# Patient Record
Sex: FEMALE | Race: OTHER | Employment: UNEMPLOYED | URBAN - METROPOLITAN AREA
[De-identification: names, ages, dates, MRNs, and addresses within clinical notes are randomized per-mention and may not be internally consistent; named-entity substitution may affect disease eponyms.]

---

## 2021-03-27 ENCOUNTER — OFFICE VISIT (OUTPATIENT)
Dept: FAMILY MEDICINE CLINIC | Facility: CLINIC | Age: 61
End: 2021-03-27
Payer: COMMERCIAL

## 2021-03-27 VITALS
SYSTOLIC BLOOD PRESSURE: 144 MMHG | HEIGHT: 67 IN | WEIGHT: 188 LBS | BODY MASS INDEX: 29.51 KG/M2 | DIASTOLIC BLOOD PRESSURE: 85 MMHG | HEART RATE: 64 BPM

## 2021-03-27 DIAGNOSIS — E05.90 HYPERTHYROIDISM: ICD-10-CM

## 2021-03-27 DIAGNOSIS — Z00.00 ANNUAL PHYSICAL EXAM: Primary | ICD-10-CM

## 2021-03-27 PROCEDURE — 3008F BODY MASS INDEX DOCD: CPT | Performed by: FAMILY MEDICINE

## 2021-03-27 PROCEDURE — 99386 PREV VISIT NEW AGE 40-64: CPT | Performed by: FAMILY MEDICINE

## 2021-03-27 PROCEDURE — 3077F SYST BP >= 140 MM HG: CPT | Performed by: FAMILY MEDICINE

## 2021-03-27 PROCEDURE — 3079F DIAST BP 80-89 MM HG: CPT | Performed by: FAMILY MEDICINE

## 2021-03-27 RX ORDER — AMLODIPINE BESYLATE 5 MG/1
5 TABLET ORAL DAILY
Qty: 90 TABLET | Refills: 0 | Status: SHIPPED | OUTPATIENT
Start: 2021-03-27 | End: 2022-03-22

## 2021-03-27 RX ORDER — AMLODIPINE BESYLATE 2.5 MG/1
2.5 TABLET ORAL DAILY
COMMUNITY
Start: 2020-12-10 | End: 2021-03-27

## 2021-03-27 RX ORDER — METHIMAZOLE 5 MG/1
2.5 TABLET ORAL DAILY
Qty: 45 TABLET | Refills: 0 | Status: SHIPPED | OUTPATIENT
Start: 2021-03-27 | End: 2021-09-24

## 2021-03-27 RX ORDER — METHIMAZOLE 5 MG/1
5 TABLET ORAL DAILY
COMMUNITY
Start: 2020-12-10 | End: 2021-03-27

## 2021-03-27 RX ORDER — PROPRANOLOL HYDROCHLORIDE 20 MG/1
20 TABLET ORAL EVERY 8 HOURS
COMMUNITY
Start: 2020-12-10 | End: 2021-03-27

## 2021-03-27 RX ORDER — PROPRANOLOL HYDROCHLORIDE 20 MG/1
20 TABLET ORAL 2 TIMES DAILY
Qty: 180 TABLET | Refills: 0 | Status: SHIPPED | OUTPATIENT
Start: 2021-03-27

## 2021-03-27 NOTE — PROGRESS NOTES
HPI/Subjective:   Patient ID: Mary Beth Eugene is a 63year old female. New patient. History medications reviewed. Needs blood test   Feeling well except gained a lot of weight      History/Other:   Review of Systems   Constitutional: Negative.   Negative Prescriptions     Signed Prescriptions Disp Refills   • amLODIPine Besylate 5 MG Oral Tab 90 tablet 0     Sig: Take 1 tablet (5 mg total) by mouth daily. • methimazole 5 MG Oral Tab 45 tablet 0     Sig: Take 0.5 tablets (2.5 mg total) by mouth daily.    •

## 2021-03-31 ENCOUNTER — LAB ENCOUNTER (OUTPATIENT)
Dept: LAB | Age: 61
End: 2021-03-31
Attending: FAMILY MEDICINE
Payer: COMMERCIAL

## 2021-03-31 DIAGNOSIS — E05.90 HYPERTHYROIDISM: ICD-10-CM

## 2021-03-31 DIAGNOSIS — Z00.00 ANNUAL PHYSICAL EXAM: ICD-10-CM

## 2021-03-31 PROCEDURE — 80061 LIPID PANEL: CPT

## 2021-03-31 PROCEDURE — 85025 COMPLETE CBC W/AUTO DIFF WBC: CPT

## 2021-03-31 PROCEDURE — 80053 COMPREHEN METABOLIC PANEL: CPT

## 2021-03-31 PROCEDURE — 84439 ASSAY OF FREE THYROXINE: CPT

## 2021-03-31 PROCEDURE — 36415 COLL VENOUS BLD VENIPUNCTURE: CPT

## 2021-03-31 PROCEDURE — 84443 ASSAY THYROID STIM HORMONE: CPT

## 2021-04-06 ENCOUNTER — VIRTUAL PHONE E/M (OUTPATIENT)
Dept: FAMILY MEDICINE CLINIC | Facility: CLINIC | Age: 61
End: 2021-04-06
Payer: COMMERCIAL

## 2021-04-06 ENCOUNTER — TELEPHONE (OUTPATIENT)
Dept: FAMILY MEDICINE CLINIC | Facility: CLINIC | Age: 61
End: 2021-04-06

## 2021-04-06 DIAGNOSIS — E05.90 HYPERTHYROIDISM: Primary | ICD-10-CM

## 2021-04-06 PROCEDURE — 99213 OFFICE O/P EST LOW 20 MIN: CPT | Performed by: FAMILY MEDICINE

## 2021-04-06 RX ORDER — METHIMAZOLE 5 MG/1
5 TABLET ORAL DAILY
Qty: 90 TABLET | Refills: 1 | Status: SHIPPED | OUTPATIENT
Start: 2021-04-06 | End: 2021-09-24

## 2021-04-09 NOTE — PROGRESS NOTES
HPI/Subjective:   Patient ID: Malaika Prakash is a 63year old female.     Telehealth outside of [facility] [address] Verbal Consent   I conducted a telehealth visit with Malaika Prakash today, 04/09/21, which was completed using two-way, real-time interactive diaphoresis and fatigue. Respiratory: Negative. Negative for apnea, cough, chest tightness and shortness of breath. Cardiovascular: Negative. Negative for chest pain, palpitations and leg swelling. Gastrointestinal: Negative.   Negative for abdom

## 2021-04-10 ENCOUNTER — OFFICE VISIT (OUTPATIENT)
Dept: FAMILY MEDICINE CLINIC | Facility: CLINIC | Age: 61
End: 2021-04-10
Payer: COMMERCIAL

## 2021-04-10 VITALS
HEIGHT: 67 IN | DIASTOLIC BLOOD PRESSURE: 83 MMHG | WEIGHT: 184 LBS | BODY MASS INDEX: 28.88 KG/M2 | SYSTOLIC BLOOD PRESSURE: 156 MMHG | HEART RATE: 58 BPM

## 2021-04-10 DIAGNOSIS — E05.90 HYPERTHYROIDISM: ICD-10-CM

## 2021-04-10 DIAGNOSIS — Z12.31 ENCOUNTER FOR SCREENING MAMMOGRAM FOR BREAST CANCER: Primary | ICD-10-CM

## 2021-04-10 PROCEDURE — 99213 OFFICE O/P EST LOW 20 MIN: CPT | Performed by: FAMILY MEDICINE

## 2021-04-10 PROCEDURE — 3008F BODY MASS INDEX DOCD: CPT | Performed by: FAMILY MEDICINE

## 2021-04-10 PROCEDURE — 3079F DIAST BP 80-89 MM HG: CPT | Performed by: FAMILY MEDICINE

## 2021-04-10 PROCEDURE — 3077F SYST BP >= 140 MM HG: CPT | Performed by: FAMILY MEDICINE

## 2021-04-12 NOTE — PROGRESS NOTES
HPI/Subjective:   Patient ID: Pepper Elliott is a 63year old female. Here for ob gyn visit . Overall doing well      History/Other:    Constitutional: Negative. Negative for activity change, appetite change, diaphoresis and fatigue.      Respiratory: N discharge or friability. Uterus: Not deviated, not enlarged, not fixed and not tender. Adnexa:         Right: No mass, tenderness or fullness. Left: No mass, tenderness or fullness.      Musculoskeletal:         General: Normal range of

## 2021-05-23 ENCOUNTER — HOSPITAL ENCOUNTER (OUTPATIENT)
Dept: MAMMOGRAPHY | Facility: HOSPITAL | Age: 61
Discharge: HOME | End: 2021-05-23
Attending: FAMILY MEDICINE
Payer: COMMERCIAL

## 2021-05-23 DIAGNOSIS — Z12.31 ENCOUNTER FOR SCREENING MAMMOGRAM FOR BREAST CANCER: ICD-10-CM

## 2021-05-23 PROCEDURE — 77063 BREAST TOMOSYNTHESIS BI: CPT | Performed by: FAMILY MEDICINE

## 2021-05-23 PROCEDURE — 77067 SCR MAMMO BI INCL CAD: CPT | Performed by: FAMILY MEDICINE

## 2021-07-06 NOTE — H&P
Name: Karen Bradley  Date: 7/7/2021    Referring Physician: Nickie Skaggs    Patient presents with:  Consult: pcp referred for hyperthyroidism. Has been on Methimazole 5mg since April and feels good.        HISTORY OF PRESENT ILLNESS   Karen Bradley is a 5 mg total) by mouth daily. (Patient not taking: Reported on 7/7/2021 ), Disp: 45 tablet, Rfl: 0     Allergies:   No Known Allergies    REVIEW OF SYSTEMS  Eyes: no change in vision  Neurologic: no headache, generalized or focal weakness or numbness.   Head: n benefits discussed: Radioactive iodine (JAVED) vs Surgery vs anti thyroid drugs (ATD) x 18 months. - she is currently on MMI 5mg PO qday    -  Patient is aware of the following:   They will need life long replacement with levothyroxine after surgery and possib fever, jaundice, dark urine, light stools, abdominal pain, anorexia, nausea, rash or joint pains,  For now, patient will continue the MMI 5mg PO qday. We will adjust the dose once we see what her test results show.  I have explained to the patient about the

## 2021-07-07 ENCOUNTER — OFFICE VISIT (OUTPATIENT)
Dept: ENDOCRINOLOGY CLINIC | Facility: CLINIC | Age: 61
End: 2021-07-07
Payer: COMMERCIAL

## 2021-07-07 ENCOUNTER — LAB ENCOUNTER (OUTPATIENT)
Dept: LAB | Facility: HOSPITAL | Age: 61
End: 2021-07-07
Attending: INTERNAL MEDICINE
Payer: COMMERCIAL

## 2021-07-07 VITALS
HEART RATE: 64 BPM | DIASTOLIC BLOOD PRESSURE: 83 MMHG | BODY MASS INDEX: 27.78 KG/M2 | RESPIRATION RATE: 14 BRPM | WEIGHT: 177 LBS | SYSTOLIC BLOOD PRESSURE: 132 MMHG | HEIGHT: 67 IN

## 2021-07-07 DIAGNOSIS — E05.90 HYPERTHYROIDISM: Primary | ICD-10-CM

## 2021-07-07 DIAGNOSIS — E05.90 HYPERTHYROIDISM: ICD-10-CM

## 2021-07-07 LAB
T3 SERPL-MCNC: 108 NG/DL (ref 60–181)
T4 FREE SERPL-MCNC: 1 NG/DL (ref 0.8–1.7)
TSI SER-ACNC: 0.13 MIU/ML (ref 0.36–3.74)

## 2021-07-07 PROCEDURE — 84480 ASSAY TRIIODOTHYRONINE (T3): CPT

## 2021-07-07 PROCEDURE — 3079F DIAST BP 80-89 MM HG: CPT | Performed by: INTERNAL MEDICINE

## 2021-07-07 PROCEDURE — 3008F BODY MASS INDEX DOCD: CPT | Performed by: INTERNAL MEDICINE

## 2021-07-07 PROCEDURE — 36415 COLL VENOUS BLD VENIPUNCTURE: CPT

## 2021-07-07 PROCEDURE — 84443 ASSAY THYROID STIM HORMONE: CPT

## 2021-07-07 PROCEDURE — 99204 OFFICE O/P NEW MOD 45 MIN: CPT | Performed by: INTERNAL MEDICINE

## 2021-07-07 PROCEDURE — 3075F SYST BP GE 130 - 139MM HG: CPT | Performed by: INTERNAL MEDICINE

## 2021-07-07 PROCEDURE — 84445 ASSAY OF TSI GLOBULIN: CPT

## 2021-07-07 PROCEDURE — 84439 ASSAY OF FREE THYROXINE: CPT

## 2021-07-08 PROBLEM — E05.90 HYPERTHYROIDISM: Status: ACTIVE | Noted: 2021-07-08

## 2021-07-11 ENCOUNTER — TELEPHONE (OUTPATIENT)
Dept: ENDOCRINOLOGY CLINIC | Facility: CLINIC | Age: 61
End: 2021-07-11

## 2021-07-11 DIAGNOSIS — E05.90 HYPERTHYROIDISM: Primary | ICD-10-CM

## 2021-07-12 NOTE — TELEPHONE ENCOUNTER
Called patient with [facility]  [phone] to patient's son to relay message below - he stated understanding for patient to continue taking MMI 5mg daily and to repeat labs in 6 weeks  Son stated patient will call to schedule f/u

## 2021-07-12 NOTE — TELEPHONE ENCOUNTER
Hi!  Could we please call this patient and let her know that I have reviewed her thyroid function tests and I would like her to continue on MMI 5mg PO qday and recheck her blood tests in 6 weeks mikhail fernandez her in clinic in 3 months with repeat blood test

## 2021-07-12 NOTE — TELEPHONE ENCOUNTER
Called patient with [facility] , Cindy Browning #[phone] - patient stated she speaks Ch[place] and requested call after 3pm (at work now)    Will call patient after 3pm with [place]

## 2021-07-13 LAB — THYROID STIMULATING IMMUNOGLOBULIN: 11.7 IU/L

## 2021-09-02 ENCOUNTER — LAB ENCOUNTER (OUTPATIENT)
Dept: LAB | Age: 61
End: 2021-09-02
Attending: INTERNAL MEDICINE
Payer: COMMERCIAL

## 2021-09-02 DIAGNOSIS — E05.90 HYPERTHYROIDISM: ICD-10-CM

## 2021-09-02 LAB
T3 SERPL-MCNC: 112 NG/DL (ref 60–181)
T4 FREE SERPL-MCNC: 0.8 NG/DL (ref 0.8–1.7)
TSI SER-ACNC: 2.34 MIU/ML (ref 0.36–3.74)

## 2021-09-02 PROCEDURE — 84443 ASSAY THYROID STIM HORMONE: CPT

## 2021-09-02 PROCEDURE — 84439 ASSAY OF FREE THYROXINE: CPT

## 2021-09-02 PROCEDURE — 36415 COLL VENOUS BLD VENIPUNCTURE: CPT

## 2021-09-02 PROCEDURE — 84480 ASSAY TRIIODOTHYRONINE (T3): CPT

## 2021-09-24 ENCOUNTER — TELEPHONE (OUTPATIENT)
Dept: ENDOCRINOLOGY CLINIC | Facility: CLINIC | Age: 61
End: 2021-09-24

## 2021-09-24 DIAGNOSIS — E05.90 HYPERTHYROIDISM: Primary | ICD-10-CM

## 2021-09-24 RX ORDER — METHIMAZOLE 5 MG/1
2.5 TABLET ORAL EVERY MORNING
Qty: 45 TABLET | Refills: 0 | Status: SHIPPED | OUTPATIENT
Start: 2021-09-24 | End: 2021-11-26

## 2021-09-24 NOTE — TELEPHONE ENCOUNTER
Hi!  Can we please call this patient and let her know that she should decrease her methimazole dose to 2.5mg PO qday and recheck labs in 6 weeks and see me in clinic at that time? Thank you!

## 2021-09-27 NOTE — TELEPHONE ENCOUNTER
Spoke to patient - PATIENT DOES NOT SPEAK [place] - patient able to communicate with RN - stated understanding to take 2.5mg MMI daily (patient stated no refill needed at this time)  Patient stated understanding to repeat labs in 6 weeks  Patient stated sh

## 2021-11-19 ENCOUNTER — LAB ENCOUNTER (OUTPATIENT)
Dept: LAB | Age: 61
End: 2021-11-19
Attending: INTERNAL MEDICINE
Payer: COMMERCIAL

## 2021-11-19 ENCOUNTER — APPOINTMENT (OUTPATIENT)
Dept: LAB | Facility: HOSPITAL | Age: 61
End: 2021-11-19
Attending: INTERNAL MEDICINE
Payer: COMMERCIAL

## 2021-11-19 DIAGNOSIS — E05.90 HYPERTHYROIDISM: ICD-10-CM

## 2021-11-19 PROCEDURE — 84480 ASSAY TRIIODOTHYRONINE (T3): CPT

## 2021-11-19 PROCEDURE — 84443 ASSAY THYROID STIM HORMONE: CPT

## 2021-11-19 PROCEDURE — 36415 COLL VENOUS BLD VENIPUNCTURE: CPT

## 2021-11-19 PROCEDURE — 84439 ASSAY OF FREE THYROXINE: CPT

## 2021-11-25 ENCOUNTER — TELEPHONE (OUTPATIENT)
Dept: ENDOCRINOLOGY CLINIC | Facility: CLINIC | Age: 61
End: 2021-11-25

## 2021-11-25 DIAGNOSIS — E05.90 HYPERTHYROIDISM: ICD-10-CM

## 2021-11-25 NOTE — TELEPHONE ENCOUNTER
Hi!    Can we please let the patient know that her TSH and FT4 are at goal. I would like to see the patient in follow-up please. The patient may continue on methimazole 2.5mg PO qday for now.

## 2021-11-26 RX ORDER — METHIMAZOLE 5 MG/1
2.5 TABLET ORAL EVERY MORNING
Qty: 15 TABLET | Refills: 0 | Status: SHIPPED | OUTPATIENT
Start: 2021-11-26 | End: 2021-12-26

## 2021-11-26 NOTE — TELEPHONE ENCOUNTER
Called patient and states she can understand [place] and declined . She confirms taking methimazole 2.5 mg daily. She said she has the 5 mg tablets and cuts them in half.   RN offered appointment for Monday 11/29/21 but states can't make it, th

## 2021-12-27 ENCOUNTER — OFFICE VISIT (OUTPATIENT)
Dept: ENDOCRINOLOGY CLINIC | Facility: CLINIC | Age: 61
End: 2021-12-27
Payer: COMMERCIAL

## 2021-12-27 VITALS
HEIGHT: 67 IN | HEART RATE: 67 BPM | BODY MASS INDEX: 27.47 KG/M2 | DIASTOLIC BLOOD PRESSURE: 92 MMHG | WEIGHT: 175 LBS | SYSTOLIC BLOOD PRESSURE: 151 MMHG

## 2021-12-27 DIAGNOSIS — I10 HYPERTENSION, UNSPECIFIED TYPE: ICD-10-CM

## 2021-12-27 DIAGNOSIS — E05.00 GRAVES DISEASE: Primary | ICD-10-CM

## 2021-12-27 DIAGNOSIS — L80 VITILIGO: ICD-10-CM

## 2021-12-27 PROCEDURE — 3080F DIAST BP >= 90 MM HG: CPT | Performed by: INTERNAL MEDICINE

## 2021-12-27 PROCEDURE — 3008F BODY MASS INDEX DOCD: CPT | Performed by: INTERNAL MEDICINE

## 2021-12-27 PROCEDURE — 99214 OFFICE O/P EST MOD 30 MIN: CPT | Performed by: INTERNAL MEDICINE

## 2021-12-27 PROCEDURE — 3077F SYST BP >= 140 MM HG: CPT | Performed by: INTERNAL MEDICINE

## 2021-12-27 RX ORDER — METHIMAZOLE 5 MG/1
2.5 TABLET ORAL EVERY MORNING
Qty: 45 TABLET | Refills: 0 | Status: SHIPPED | OUTPATIENT
Start: 2021-12-27 | End: 2022-03-27

## 2021-12-27 RX ORDER — AMLODIPINE BESYLATE 5 MG/1
5 TABLET ORAL DAILY
Qty: 90 TABLET | Refills: 0 | Status: SHIPPED | OUTPATIENT
Start: 2021-12-27 | End: 2022-03-27

## 2021-12-27 NOTE — PROGRESS NOTES
Name: Nanda Kimball  Date: 12/27/21    Referring Physician: No ref.  provider found    Patient presents with:  Hyperthyroidism      HISTORY OF PRESENT ILLNESS   Nanda Kimball is a 63year old female who presents for Patient presents with:  Hyperthyroidism vision  Neurologic: no headache, generalized or focal weakness or numbness.   Head: normal  ENT: normal  Lungs: no shortness of breath, wheezing or URBAN  Cardiovascular:  no chest pain or palpitations  Gastrointestinal:  no abdominal pain, bowel movement pro Methimazole 2.5mg (Start date: 2018) and continue with propranolol 20mg PO bid  - Check TSH, FT4, TT3, CMP, and CBC with diff in 3 months  - Side effects discussed:  > pruritus, rash, urticaria, arthralgias, arthritis, fever, abnormal taste sensation, naus

## 2022-03-28 RX ORDER — METHIMAZOLE 5 MG/1
2.5 TABLET ORAL EVERY MORNING
Qty: 45 TABLET | Refills: 0 | Status: SHIPPED | OUTPATIENT
Start: 2022-03-28 | End: 2022-06-26

## 2022-03-28 RX ORDER — AMLODIPINE BESYLATE 5 MG/1
TABLET ORAL
Qty: 90 TABLET | Refills: 0 | Status: SHIPPED | OUTPATIENT
Start: 2022-03-28

## 2022-03-28 NOTE — TELEPHONE ENCOUNTER
LOV: 12/27/21 RTC 3 months    No future appointments. Called patient and she handed the phone to her daughter Funmi Arevalo. RN made an appointment as below and reviewed [facility] location. Future Appointments   Date Time Provider Rola Arreguin   4/21/2022 12:45 PM Sean Yu MD [facility]       They also requested to refill methimazole 2.5 mg daily. RN refilled per protocol.

## 2022-04-21 ENCOUNTER — OFFICE VISIT (OUTPATIENT)
Dept: ENDOCRINOLOGY CLINIC | Facility: CLINIC | Age: 62
End: 2022-04-21
Payer: COMMERCIAL

## 2022-04-21 ENCOUNTER — LAB ENCOUNTER (OUTPATIENT)
Dept: LAB | Age: 62
End: 2022-04-21
Attending: INTERNAL MEDICINE
Payer: COMMERCIAL

## 2022-04-21 VITALS
BODY MASS INDEX: 27.47 KG/M2 | SYSTOLIC BLOOD PRESSURE: 128 MMHG | HEART RATE: 61 BPM | WEIGHT: 175 LBS | DIASTOLIC BLOOD PRESSURE: 81 MMHG | HEIGHT: 67 IN

## 2022-04-21 DIAGNOSIS — I10 HYPERTENSION, UNSPECIFIED TYPE: ICD-10-CM

## 2022-04-21 DIAGNOSIS — E05.00 GRAVES DISEASE: Primary | ICD-10-CM

## 2022-04-21 DIAGNOSIS — L80 VITILIGO: ICD-10-CM

## 2022-04-21 DIAGNOSIS — E05.00 GRAVES DISEASE: ICD-10-CM

## 2022-04-21 LAB
ALBUMIN SERPL-MCNC: 4 G/DL (ref 3.4–5)
ALBUMIN/GLOB SERPL: 1.2 {RATIO} (ref 1–2)
ALP LIVER SERPL-CCNC: 81 U/L
ALT SERPL-CCNC: 32 U/L
ANION GAP SERPL CALC-SCNC: 5 MMOL/L (ref 0–18)
AST SERPL-CCNC: 24 U/L (ref 15–37)
BASOPHILS # BLD AUTO: 0.05 X10(3) UL (ref 0–0.2)
BASOPHILS NFR BLD AUTO: 1 %
BILIRUB SERPL-MCNC: 0.6 MG/DL (ref 0.1–2)
BUN BLD-MCNC: 8 MG/DL (ref 7–18)
BUN/CREAT SERPL: 9.6 (ref 10–20)
CALCIUM BLD-MCNC: 9.1 MG/DL (ref 8.5–10.1)
CHLORIDE SERPL-SCNC: 109 MMOL/L (ref 98–112)
CO2 SERPL-SCNC: 26 MMOL/L (ref 21–32)
CREAT BLD-MCNC: 0.83 MG/DL
DEPRECATED RDW RBC AUTO: 43 FL (ref 35.1–46.3)
EOSINOPHIL # BLD AUTO: 0.23 X10(3) UL (ref 0–0.7)
EOSINOPHIL NFR BLD AUTO: 4.8 %
ERYTHROCYTE [DISTWIDTH] IN BLOOD BY AUTOMATED COUNT: 12.5 % (ref 11–15)
FASTING STATUS PATIENT QL REPORTED: NO
GLOBULIN PLAS-MCNC: 3.4 G/DL (ref 2.8–4.4)
GLUCOSE BLD-MCNC: 92 MG/DL (ref 70–99)
HCT VFR BLD AUTO: 41.7 %
HGB BLD-MCNC: 13.5 G/DL
IMM GRANULOCYTES # BLD AUTO: 0.01 X10(3) UL (ref 0–1)
IMM GRANULOCYTES NFR BLD: 0.2 %
LYMPHOCYTES # BLD AUTO: 1.53 X10(3) UL (ref 1–4)
LYMPHOCYTES NFR BLD AUTO: 31.7 %
MCH RBC QN AUTO: 30.3 PG (ref 26–34)
MCHC RBC AUTO-ENTMCNC: 32.4 G/DL (ref 31–37)
MCV RBC AUTO: 93.5 FL
MONOCYTES # BLD AUTO: 0.42 X10(3) UL (ref 0.1–1)
MONOCYTES NFR BLD AUTO: 8.7 %
NEUTROPHILS # BLD AUTO: 2.59 X10 (3) UL (ref 1.5–7.7)
NEUTROPHILS # BLD AUTO: 2.59 X10(3) UL (ref 1.5–7.7)
NEUTROPHILS NFR BLD AUTO: 53.6 %
OSMOLALITY SERPL CALC.SUM OF ELEC: 288 MOSM/KG (ref 275–295)
PLATELET # BLD AUTO: 189 10(3)UL (ref 150–450)
POTASSIUM SERPL-SCNC: 4.1 MMOL/L (ref 3.5–5.1)
PROT SERPL-MCNC: 7.4 G/DL (ref 6.4–8.2)
RBC # BLD AUTO: 4.46 X10(6)UL
SODIUM SERPL-SCNC: 140 MMOL/L (ref 136–145)
T3 SERPL-MCNC: 129 NG/DL (ref 60–181)
T4 FREE SERPL-MCNC: 1 NG/DL (ref 0.8–1.7)
TSI SER-ACNC: 1.88 MIU/ML (ref 0.36–3.74)
WBC # BLD AUTO: 4.8 X10(3) UL (ref 4–11)

## 2022-04-21 PROCEDURE — 3008F BODY MASS INDEX DOCD: CPT | Performed by: INTERNAL MEDICINE

## 2022-04-21 PROCEDURE — 84480 ASSAY TRIIODOTHYRONINE (T3): CPT

## 2022-04-21 PROCEDURE — 3079F DIAST BP 80-89 MM HG: CPT | Performed by: INTERNAL MEDICINE

## 2022-04-21 PROCEDURE — 84439 ASSAY OF FREE THYROXINE: CPT

## 2022-04-21 PROCEDURE — 36415 COLL VENOUS BLD VENIPUNCTURE: CPT

## 2022-04-21 PROCEDURE — 84443 ASSAY THYROID STIM HORMONE: CPT

## 2022-04-21 PROCEDURE — 99214 OFFICE O/P EST MOD 30 MIN: CPT | Performed by: INTERNAL MEDICINE

## 2022-04-21 PROCEDURE — 80053 COMPREHEN METABOLIC PANEL: CPT

## 2022-04-21 PROCEDURE — 3074F SYST BP LT 130 MM HG: CPT | Performed by: INTERNAL MEDICINE

## 2022-04-21 PROCEDURE — 85025 COMPLETE CBC W/AUTO DIFF WBC: CPT

## 2022-04-23 ENCOUNTER — TELEPHONE (OUTPATIENT)
Dept: ENDOCRINOLOGY CLINIC | Facility: CLINIC | Age: 62
End: 2022-04-23

## 2022-04-23 RX ORDER — METHIMAZOLE 5 MG/1
2.5 TABLET ORAL EVERY MORNING
Qty: 45 TABLET | Refills: 0 | Status: SHIPPED | OUTPATIENT
Start: 2022-04-23 | End: 2022-07-22

## 2022-04-23 NOTE — TELEPHONE ENCOUNTER
Hi!  Please let patient know that I have reviewed all of her blood tests and that she can stay on her current dose of methimazole. I will see her back in clinic in 3 months. Please asl her to have the tests done before visit. Thank you!

## 2022-04-25 NOTE — TELEPHONE ENCOUNTER
Called patient and she asked for RN to speak with her daughter Roz Rogers (on KATHY). RN relayed below message as outlined. Declined to make an appointment right now and states they will call back.

## 2022-06-26 DIAGNOSIS — E05.00 GRAVES DISEASE: ICD-10-CM

## 2022-06-26 DIAGNOSIS — I10 HYPERTENSION, UNSPECIFIED TYPE: ICD-10-CM

## 2022-06-28 RX ORDER — AMLODIPINE BESYLATE 5 MG/1
TABLET ORAL
Qty: 90 TABLET | Refills: 0 | OUTPATIENT
Start: 2022-06-28

## 2022-06-30 RX ORDER — METHIMAZOLE 5 MG/1
2.5 TABLET ORAL EVERY MORNING
Qty: 15 TABLET | Refills: 0 | Status: SHIPPED | OUTPATIENT
Start: 2022-06-30 | End: 2022-07-30

## 2022-06-30 NOTE — TELEPHONE ENCOUNTER
Hi!  Please let her know that she should make a follow-up appointment and get labs done. I will refill for one month only. Thank you!

## 2022-07-09 ENCOUNTER — OFFICE VISIT (OUTPATIENT)
Dept: FAMILY MEDICINE CLINIC | Facility: CLINIC | Age: 62
End: 2022-07-09
Payer: COMMERCIAL

## 2022-07-09 VITALS
HEART RATE: 66 BPM | WEIGHT: 184 LBS | HEIGHT: 67 IN | DIASTOLIC BLOOD PRESSURE: 85 MMHG | BODY MASS INDEX: 28.88 KG/M2 | SYSTOLIC BLOOD PRESSURE: 147 MMHG

## 2022-07-09 DIAGNOSIS — I10 HYPERTENSION, UNSPECIFIED TYPE: ICD-10-CM

## 2022-07-09 DIAGNOSIS — Z12.11 ENCOUNTER FOR SCREENING COLONOSCOPY: ICD-10-CM

## 2022-07-09 DIAGNOSIS — Z00.00 ANNUAL PHYSICAL EXAM: Primary | ICD-10-CM

## 2022-07-09 DIAGNOSIS — L80 VITILIGO: ICD-10-CM

## 2022-07-09 PROCEDURE — 3008F BODY MASS INDEX DOCD: CPT | Performed by: FAMILY MEDICINE

## 2022-07-09 PROCEDURE — 99213 OFFICE O/P EST LOW 20 MIN: CPT | Performed by: FAMILY MEDICINE

## 2022-07-09 PROCEDURE — 3079F DIAST BP 80-89 MM HG: CPT | Performed by: FAMILY MEDICINE

## 2022-07-09 PROCEDURE — 3077F SYST BP >= 140 MM HG: CPT | Performed by: FAMILY MEDICINE

## 2022-07-09 RX ORDER — PROPRANOLOL HYDROCHLORIDE 20 MG/1
20 TABLET ORAL 2 TIMES DAILY
Qty: 180 TABLET | Refills: 0 | Status: SHIPPED | OUTPATIENT
Start: 2022-07-09

## 2022-07-09 RX ORDER — MOMETASONE FUROATE 1 MG/G
CREAM TOPICAL
Qty: 60 G | Refills: 1 | Status: SHIPPED | OUTPATIENT
Start: 2022-07-09

## 2022-07-09 RX ORDER — AMLODIPINE BESYLATE 5 MG/1
5 TABLET ORAL DAILY
Qty: 90 TABLET | Refills: 1 | Status: SHIPPED | OUTPATIENT
Start: 2022-07-09

## 2022-07-21 ENCOUNTER — LAB ENCOUNTER (OUTPATIENT)
Dept: LAB | Age: 62
End: 2022-07-21
Attending: FAMILY MEDICINE
Payer: COMMERCIAL

## 2022-07-21 DIAGNOSIS — Z00.00 ANNUAL PHYSICAL EXAM: ICD-10-CM

## 2022-07-21 DIAGNOSIS — E05.00 GRAVES DISEASE: ICD-10-CM

## 2022-07-21 LAB
CHOLEST SERPL-MCNC: 208 MG/DL (ref ?–200)
FASTING PATIENT LIPID ANSWER: YES
HDLC SERPL-MCNC: 58 MG/DL (ref 40–59)
LDLC SERPL CALC-MCNC: 140 MG/DL (ref ?–100)
NONHDLC SERPL-MCNC: 150 MG/DL (ref ?–130)
T3 SERPL-MCNC: 123 NG/DL (ref 60–181)
T4 FREE SERPL-MCNC: 1 NG/DL (ref 0.8–1.7)
TRIGL SERPL-MCNC: 58 MG/DL (ref 30–149)
TSI SER-ACNC: 1.26 MIU/ML (ref 0.36–3.74)
VLDLC SERPL CALC-MCNC: 11 MG/DL (ref 0–30)

## 2022-07-21 PROCEDURE — 84439 ASSAY OF FREE THYROXINE: CPT

## 2022-07-21 PROCEDURE — 80061 LIPID PANEL: CPT

## 2022-07-21 PROCEDURE — 84443 ASSAY THYROID STIM HORMONE: CPT

## 2022-07-21 PROCEDURE — 36415 COLL VENOUS BLD VENIPUNCTURE: CPT

## 2022-07-21 PROCEDURE — 84480 ASSAY TRIIODOTHYRONINE (T3): CPT

## 2022-08-16 ENCOUNTER — OFFICE VISIT (OUTPATIENT)
Dept: ENDOCRINOLOGY CLINIC | Facility: CLINIC | Age: 62
End: 2022-08-16
Payer: COMMERCIAL

## 2022-08-16 VITALS
DIASTOLIC BLOOD PRESSURE: 83 MMHG | HEART RATE: 51 BPM | RESPIRATION RATE: 16 BRPM | WEIGHT: 185 LBS | BODY MASS INDEX: 29.03 KG/M2 | HEIGHT: 67 IN | SYSTOLIC BLOOD PRESSURE: 135 MMHG

## 2022-08-16 DIAGNOSIS — E05.00 GRAVES DISEASE: Primary | ICD-10-CM

## 2022-08-16 PROCEDURE — 99214 OFFICE O/P EST MOD 30 MIN: CPT | Performed by: INTERNAL MEDICINE

## 2022-08-16 PROCEDURE — 3079F DIAST BP 80-89 MM HG: CPT | Performed by: INTERNAL MEDICINE

## 2022-08-16 PROCEDURE — 3075F SYST BP GE 130 - 139MM HG: CPT | Performed by: INTERNAL MEDICINE

## 2022-08-16 PROCEDURE — 3008F BODY MASS INDEX DOCD: CPT | Performed by: INTERNAL MEDICINE

## 2022-09-22 DIAGNOSIS — E05.00 GRAVES DISEASE: ICD-10-CM

## 2022-09-23 RX ORDER — METHIMAZOLE 5 MG/1
TABLET ORAL
Qty: 45 TABLET | Refills: 0 | Status: SHIPPED | OUTPATIENT
Start: 2022-09-23

## 2022-10-11 RX ORDER — PROPRANOLOL HYDROCHLORIDE 20 MG/1
20 TABLET ORAL 2 TIMES DAILY
Qty: 180 TABLET | Refills: 1 | Status: SHIPPED | OUTPATIENT
Start: 2022-10-11 | End: 2023-07-06

## 2022-10-11 NOTE — TELEPHONE ENCOUNTER
Refill passed per [facility] protocol.       Requested Prescriptions   Pending Prescriptions Disp Refills    PROPRANOLOL 20 MG Oral Tab [Pharmacy Med Name: PROPRANOLOL 20MG TABLETS] 180 tablet 0     Sig: TAKE 1 TABLET(20 MG) BY MOUTH TWICE DAILY        Hypertensive Medications Protocol Passed - 10/10/2022 12:46 PM        Passed - In person appointment in the past 12 or next 3 months       Recent Outpatient Visits              1 month ago [facility] disease    [facility] Rosmery Palomares MD    Office Visit    3 months ago Annual physical exam    Mathew Lazcano MD    Office Visit    5 months ago [facility] Endocrinology Rosmery Palomares MD    Office Visit    9 months ago [facility] Endocrinology Rosmery Palomares MD    Office Visit    1 year ago Hyperthyroidism    [facility] Rosmery Palomares MD    Office Visit                 Passed - Last BP reading less than 140/90     BP Readings from Last 1 Encounters:  08/16/22 : 135/83                Passed - CMP or BMP in past 6 months     Recent Results (from the past 4392 hour(s))   COMP METABOLIC PANEL (14)    Collection Time: 04/21/22  1:31 PM   Result Value Ref Range    Glucose 92 70 - 99 mg/dL    Sodium 140 136 - 145 mmol/L    Potassium 4.1 3.5 - 5.1 mmol/L    Chloride 109 98 - 112 mmol/L    CO2 26.0 21.0 - 32.0 mmol/L    Anion Gap 5 0 - 18 mmol/L    BUN 8 7 - 18 mg/dL    Creatinine 0.83 0.55 - 1.02 mg/dL    BUN/CREA Ratio 9.6 (L) 10.0 - 20.0    Calcium, Total 9.1 8.5 - 10.1 mg/dL    Calculated Osmolality 288 275 - 295 mOsm/kg    GFR, Non- 77 >=60    GFR, -American 89 >=60    ALT 32 13 - 56 U/L    AST 24 15 - 37 U/L    Alkaline Phosphatase 81 46 - 118 U/L    Bilirubin, Total 0.6 0.1 - 2.0 mg/dL    Total Protein 7.4 6.4 - 8.2 g/dL    Albumin 4.0 3.4 - 5.0 g/dL    Globulin  3.4 2.8 - 4.4 g/dL A/G Ratio 1.2 1.0 - 2.0    Patient Fasting for CMP? No      *Note: Due to a large number of results and/or encounters for the requested time period, some results have not been displayed. A complete set of results can be found in Results Review.                  Passed - In person appointment or virtual visit in the past 6 months       Recent Outpatient Visits              1 month ago Graves disease    [address] Thomas MD    Office Visit    3 months ago Annual physical exam    Roxi Flores MD    Office Visit    5 months ago [facility] disease    [facility] Endocrinology Bethanie Thomas MD    Office Visit    9 months ago Graves disease    [facility] Endocrinology Bethanie Thomas MD    Office Visit    1 year ago Hyperthyroidism    [address] Wayne Thomas MD    Office Visit                 [facility] or [facility] > 50     GFR Evaluation  GFRNAA: 68 , resulted on 4/21/2022                       Recent Outpatient Visits              1 month ago Graves disease    [address] Wayne Thomas MD    Office Visit    3 months ago Annual physical exam    Roix Flores MD    Office Visit    5 months ago [facility] disease    [facility] Endocrinology Bethanie Thomas MD    Office Visit    9 months ago [facility] disease    [facility] Endocrinology Bethanie Thomas MD    Office Visit    1 year ago Hyperthyroidism    [address] Wayne Thomas MD    Office Visit

## 2022-12-01 ENCOUNTER — TELEPHONE (OUTPATIENT)
Dept: FAMILY MEDICINE CLINIC | Facility: CLINIC | Age: 62
End: 2022-12-01

## 2022-12-02 ENCOUNTER — TELEPHONE (OUTPATIENT)
Dept: FAMILY MEDICINE CLINIC | Facility: CLINIC | Age: 62
End: 2022-12-02

## 2022-12-02 NOTE — TELEPHONE ENCOUNTER
Called pt, name and  verified, informed pt starting  we will no longer accept her insurance.  If needed to see dr to make an appt before . Pt understood

## 2023-01-05 DIAGNOSIS — I10 HYPERTENSION, UNSPECIFIED TYPE: ICD-10-CM

## 2023-01-05 RX ORDER — AMLODIPINE BESYLATE 5 MG/1
5 TABLET ORAL DAILY
Qty: 20 TABLET | Refills: 0 | Status: SHIPPED | OUTPATIENT
Start: 2023-01-05

## 2023-01-05 NOTE — TELEPHONE ENCOUNTER
[facility]  Kerry Payne  #[phone]. Spoke with son and relayed below message.

## 2023-07-06 RX ORDER — PROPRANOLOL HYDROCHLORIDE 20 MG/1
20 TABLET ORAL 2 TIMES DAILY
Qty: 180 TABLET | Refills: 1 | Status: SHIPPED | OUTPATIENT
Start: 2023-07-06

## 2023-07-06 NOTE — TELEPHONE ENCOUNTER
Please review. Protocol failed / No protocol. Requested Prescriptions   Pending Prescriptions Disp Refills    PROPRANOLOL 20 MG Oral Tab [Pharmacy Med Name: PROPRANOLOL 20MG TABLETS] 180 tablet 1     Sig: TAKE 1 TABLET(20 MG) BY MOUTH TWICE DAILY       Hypertensive Medications Protocol Failed - 7/5/2023  6:59 PM        Failed - CMP or BMP in past 6 months     No results found for this or any previous visit (from the past 4392 hour(s)).             Failed - In person appointment or virtual visit in the past 6 months     Recent Outpatient Visits              10 months ago Graves disease    Kathy Fine MD    Office Visit    12 months ago Annual physical exam    Maria E Pham MD    Office Visit    1 year ago Graves disease    [address], Liliana Scott MD    Office Visit    1 year ago Graves disease    [address], Liliana Scott MD    Office Visit    1 year ago Hyperthyroidism    [address] [facility], Juancarlos Vides MD    Office Visit                      Failed - EGFRCR or GFRNAA > 50     GFR Evaluation            Passed - In person appointment in the past 12 or next 3 months     Recent Outpatient Visits              10 months ago [facility] disease    [address] [facility], Juancarlos Vides MD    Office Visit    12 months ago Annual physical exam    Maria E Pham MD    Office Visit    1 year ago Graves disease    Bree Sykes MD    Office Visit    1 year ago Graves disease    [address], Liliana Scott MD    Office Visit    1 year ago Hyperthyroidism Armando Urbano MD    Office Visit                      Passed - Last BP reading less than 140/90     BP Readings from Last 1 Encounters:  08/16/22 : 135/83                       Recent Outpatient Visits              10 months ago Graves disease    [address] [facility], Ryan Salamanca MD    Office Visit    12 months ago Annual physical exam    Juve Galindo MD    Office Visit    1 year ago Graves disease    [address], Eddie Broussard MD    Office Visit    1 year ago Graves disease    [address], Eddie Broussard MD    Office Visit    1 year ago Hyperthyroidism    [address] [facility], Ryan Salamanca MD    Office Visit

## 2024-08-12 ENCOUNTER — TELEPHONE (OUTPATIENT)
Dept: INTERNAL MEDICINE UNIT | Facility: HOSPITAL | Age: 64
End: 2024-08-12

## (undated) DIAGNOSIS — E05.00 GRAVES DISEASE: Primary | ICD-10-CM

## (undated) DIAGNOSIS — I10 HYPERTENSION, UNSPECIFIED TYPE: ICD-10-CM

## (undated) DIAGNOSIS — E05.00 GRAVES DISEASE: ICD-10-CM

## (undated) NOTE — LETTER
4/15/2021              [address] [place] [id number]         Dear Timo Dick,    It was a pleasure to see you. Your PAP test was normal.  There is no need for further testing at this time.   I look forward to seei